# Patient Record
(demographics unavailable — no encounter records)

---

## 2024-10-28 NOTE — PROCEDURE
[FreeTextEntry1] : Plan:  Examination.  (Hb=[id number]).  Surgical debridement of the affected skin area.  We demonstrated how to apply lotion and made OTC recommendations.  Right 2nd toenail, right 3rd toenail, left 2nd toenail, left 3rd toenail and left 4th toenail.  Mycotic toenails were debrided using manual cutters and electrical  to patient tolerance. Patient to continue Penlac nail gel to the affected toenails daily. Patient to return:  1 month.

## 2024-10-28 NOTE — PHYSICAL EXAM
[FreeTextEntry3] : Vascular Exam: DP Pulse (left): 1/4. DP Pulse (right): 1/4. PT Pulse (left): 1/4. PT Pulse (right): 1/4. Capillary Return - L: Instantaneous. Capillary Return - R: Instantaneous. Temperature Grad - L: Hot to Warm. Temperature Grad - R: Hot to Warm.   [de-identified] : Orthopedic Exam:  Patient presents with semirigid hammertoes on both feet.  No discomfort upon examination at this time. [FreeTextEntry1] : Neurological Exam: Achilles Reflex - L: Delayed. Achilles Reflex - R: Delayed. Sharp / Dull - L: Decreased. Sharp / Dull - R: Decreased. Light Touch/pressure - L: Decreased. Light Touch/pressure - R: Decreased. Hot/cold - L: Decreased. Hot/cold - R: Decreased. Vibratory - L: Decreased. Vibratory - R: Decreased. [place] Nathan 5.07 - L: Diminished. [place] Nathan 5.07 - R: Diminished.

## 2024-10-28 NOTE — HISTORY OF PRESENT ILLNESS
[FreeTextEntry1] : Kole is a 54-year-old male who presents to our office for continued care of a fungus infection in his toenails and dry skin on his feet.  He continues to apply lotion and the nail gel daily.

## 2024-11-26 NOTE — PROCEDURE
[FreeTextEntry1] : Plan:  Examination.  ([id number]=[id number]).  We had a lengthy discussion concerning the patient's condition.  Rx: Bactroban ointment to be applied to the dermabrasions daily.   Surgical debridement of the affected skin area.  We demonstrated how to apply lotion and made OTC recommendations.  Right 2nd toenail, right 3rd toenail, left 2nd toenail, left 3rd toenail and left 4th toenail. ([id number]).  Mycotic toenails were debrided using manual cutters and electrical  to patient tolerance. Patient to continue Penlac nail gel to the affected toenails daily. Patient to return:  1 month.

## 2024-11-26 NOTE — PROCEDURE
[FreeTextEntry1] : Plan:  Examination.  (Pf=[id number]).  We had a lengthy discussion concerning the patient's condition.  Rx: Bactroban ointment to be applied to the dermabrasions daily.   Surgical debridement of the affected skin area.  We demonstrated how to apply lotion and made OTC recommendations.  Right 2nd toenail, right 3rd toenail, left 2nd toenail, left 3rd toenail and left 4th toenail. ([id number]).  Mycotic toenails were debrided using manual cutters and electrical  to patient tolerance. Patient to continue Penlac nail gel to the affected toenails daily. Patient to return:  1 month.

## 2024-11-26 NOTE — PHYSICAL EXAM
[FreeTextEntry3] : Vascular Exam: DP Pulse (left): 1/4. DP Pulse (right): 1/4. PT Pulse (left): 1/4. PT Pulse (right): 1/4. Capillary Return - L: Instantaneous. Capillary Return - R: Instantaneous. Temperature Grad - L: Hot to Warm. Temperature Grad - R: Hot to Warm.   [de-identified] : Orthopedic Exam:  Patient presents with semirigid hammertoes on both feet.  No discomfort upon examination at this time. [FreeTextEntry1] : Neurological Exam: Achilles Reflex - L: Delayed. Achilles Reflex - R: Delayed. Sharp / Dull - L: Decreased. Sharp / Dull - R: Decreased. Light Touch/pressure - L: Decreased. Light Touch/pressure - R: Decreased. Hot/cold - L: Decreased. Hot/cold - R: Decreased. Vibratory - L: Decreased. Vibratory - R: Decreased. [place] Nathan 5.07 - L: Diminished. [place] Nathan 5.07 - R: Diminished.

## 2024-11-26 NOTE — PHYSICAL EXAM
[FreeTextEntry3] : Vascular Exam: DP Pulse (left): 1/4. DP Pulse (right): 1/4. PT Pulse (left): 1/4. PT Pulse (right): 1/4. Capillary Return - L: Instantaneous. Capillary Return - R: Instantaneous. Temperature Grad - L: Hot to Warm. Temperature Grad - R: Hot to Warm.   [de-identified] : Orthopedic Exam:  Patient presents with semirigid hammertoes on both feet.  No discomfort upon examination at this time. [FreeTextEntry1] : Neurological Exam: Achilles Reflex - L: Delayed. Achilles Reflex - R: Delayed. Sharp / Dull - L: Decreased. Sharp / Dull - R: Decreased. Light Touch/pressure - L: Decreased. Light Touch/pressure - R: Decreased. Hot/cold - L: Decreased. Hot/cold - R: Decreased. Vibratory - L: Decreased. Vibratory - R: Decreased. [place] Nathan 5.07 - L: Diminished. [place] Nathan 5.07 - R: Diminished.

## 2024-11-26 NOTE — HISTORY OF PRESENT ILLNESS
[FreeTextEntry1] : Kole is a 54-year-old male who presents to our office for continued care of a fungus infection in his toenails and dry skin on his feet.  He continues to apply lotion and the nail gel daily.  He develops red spots on his feet at pressure points and is concerned for wounds.

## 2025-01-06 NOTE — PHYSICAL EXAM
[FreeTextEntry3] : Vascular Exam: DP Pulse (left): 1/4. DP Pulse (right): 1/4. PT Pulse (left): 1/4. PT Pulse (right): 1/4. Capillary Return - L: Instantaneous. Capillary Return - R: Instantaneous. Temperature Grad - L: Hot to Warm. Temperature Grad - R: Hot to Warm.   [de-identified] : Orthopedic Exam:  Patient presents with semirigid hammertoes on both feet.  No discomfort upon examination at this time. [FreeTextEntry1] : Neurological Exam: Achilles Reflex - L: Delayed. Achilles Reflex - R: Delayed. Sharp / Dull - L: Decreased. Sharp / Dull - R: Decreased. Light Touch/pressure - L: Decreased. Light Touch/pressure - R: Decreased. Hot/cold - L: Decreased. Hot/cold - R: Decreased. Vibratory - L: Decreased. Vibratory - R: Decreased. [place] Nathan 5.07 - L: Diminished. [place] Nathan 5.07 - R: Diminished.

## 2025-01-06 NOTE — PROCEDURE
[FreeTextEntry1] : Plan:  Examination.  ([id number]).  Right 2nd toenail, right 3rd toenail, left 2nd toenail, left 3rd toenail and left 4th toenail.  Mycotic toenails were debrided using manual cutters and electrical  to patient tolerance. Patient to continue Penlac nail gel to the affected toenails daily. Patient to return:  1 month.

## 2025-01-06 NOTE — HISTORY OF PRESENT ILLNESS
[FreeTextEntry1] : Kole is a 54-year-old male who presents to our office this afternoon for continued care of a fungus infection in his toenails and dry skin on his feet.  He continues to apply lotion and the nail gel daily.  He admits to being out of control with his sugar.  He just got his bloodwork and is hoping the MD has a plan to help him.

## 2025-02-24 NOTE — HISTORY OF PRESENT ILLNESS
[FreeTextEntry1] : Kole is a 55-year-old male who presents to our office this afternoon for continued care of a fungus infection in his toenails and dry skin on his feet.  He continues to apply lotion and the nail gel daily with slow improvement.  He is not happy with the Penlac.  He is requesting Formula 7.

## 2025-02-24 NOTE — PHYSICAL EXAM
[FreeTextEntry3] : Vascular Exam: DP Pulse (left): 1/4. DP Pulse (right): 1/4. PT Pulse (left): 1/4. PT Pulse (right): 1/4. Capillary Return - L: Instantaneous. Capillary Return - R: Instantaneous. Temperature Grad - L: Hot to Warm. Temperature Grad - R: Hot to Warm.   [de-identified] : Orthopedic Exam:  Patient presents with semirigid hammertoes on both feet.  No discomfort upon examination at this time. [FreeTextEntry1] : Neurological Exam: Achilles Reflex - L: Delayed. Achilles Reflex - R: Delayed. Sharp / Dull - L: Decreased. Sharp / Dull - R: Decreased. Light Touch/pressure - L: Decreased. Light Touch/pressure - R: Decreased. Hot/cold - L: Decreased. Hot/cold - R: Decreased. Vibratory - L: Decreased. Vibratory - R: Decreased. [place] Nathan 5.07 - L: Diminished. [place] Nathan 5.07 - R: Diminished.

## 2025-02-24 NOTE — PROCEDURE
[FreeTextEntry1] : Plan:  Examination.  (Hm=[id number]).  We had a lengthy discussion concerning the patient's condition.  Surgical debridement of the affected skin area.  Lotion was applied and he is to continue to apply it daily.  Right 2nd toenail, right 3rd toenail, left 2nd toenail, left 3rd toenail and left 4th toenail. ([id number]=[id number]).  Mycotic toenails were debrided using manual cutters and electrical  to patient tolerance. Patient is to discontinue Penlac. We ordered Formual 7 to be applied to the affected toenails daily. Patient to return:  1 month.

## 2025-03-24 NOTE — HISTORY OF PRESENT ILLNESS
[FreeTextEntry1] : Kole is a 55-year-old male who presents this afternoon for continued care of a fungus infection in his toenails and dry skin on his feet.  He continues to apply lotion and Formula 7 daily.  He is much happier with the improvement.

## 2025-03-24 NOTE — PROCEDURE
[FreeTextEntry1] : Plan:  Examination.  (Qp=[id number]).  We had a lengthy discussion concerning the patient's condition.  Surgical debridement of the affected skin area.  Lotion was applied and he is to continue to apply it daily.  Right 2nd toenail, right 3rd toenail, left 2nd toenail, left 3rd toenail and left 4th toenail.  Mycotic toenails were debrided using manual cutters and electrical  to patient tolerance. Patient is to continue Formula 7 to be applied to the affected toenails daily. Patient to return:  1 month.

## 2025-03-24 NOTE — PHYSICAL EXAM
[General Appearance - Alert] : alert [General Appearance - In No Acute Distress] : in no acute distress [FreeTextEntry3] : Vascular Exam: DP Pulse (left): 1/4. DP Pulse (right): 1/4. PT Pulse (left): 1/4. PT Pulse (right): 1/4. Capillary Return - L: Instantaneous. Capillary Return - R: Instantaneous. Temperature Grad - L: Hot to Warm. Temperature Grad - R: Hot to Warm.   [de-identified] : Orthopedic Exam:  Patient presents with semirigid hammertoes on both feet.  No discomfort upon examination at this time. [FreeTextEntry1] : Neurological Exam: Achilles Reflex - L: Delayed. Achilles Reflex - R: Delayed. Sharp / Dull - L: Decreased. Sharp / Dull - R: Decreased. Light Touch/pressure - L: Decreased. Light Touch/pressure - R: Decreased. Hot/cold - L: Decreased. Hot/cold - R: Decreased. Vibratory - L: Decreased. Vibratory - R: Decreased. [place] Nathan 5.07 - L: Diminished. [place] Nathan 5.07 - R: Diminished.

## 2025-05-05 NOTE — PHYSICAL EXAM
[General Appearance - Alert] : alert [General Appearance - In No Acute Distress] : in no acute distress [FreeTextEntry3] : Vascular Exam: DP Pulse (left): 1/4. DP Pulse (right): 1/4. PT Pulse (left): 1/4. PT Pulse (right): 1/4. Capillary Return - L: Instantaneous. Capillary Return - R: Instantaneous. Temperature Grad - L: Hot to Warm. Temperature Grad - R: Hot to Warm.   [de-identified] : Orthopedic Exam:  Patient presents with semirigid hammertoes on both feet.  No discomfort upon examination at this time. [FreeTextEntry1] : Neurological Exam: Achilles Reflex - L: Delayed. Achilles Reflex - R: Delayed. Sharp / Dull - L: Decreased. Sharp / Dull - R: Decreased. Light Touch/pressure - L: Decreased. Light Touch/pressure - R: Decreased. Hot/cold - L: Decreased. Hot/cold - R: Decreased. Vibratory - L: Decreased. Vibratory - R: Decreased. [place] Nathan 5.07 - L: Diminished. [place] Nathan 5.07 - R: Diminished.

## 2025-05-05 NOTE — PROCEDURE
[FreeTextEntry1] : Plan:  Examination.  ([id number]).  We had a lengthy discussion concerning the patient's condition.  Surgical debridement of the affected skin area.  Lotion was applied and he is to continue to apply it daily.  Right 2nd toenail, right 3rd toenail, left 2nd toenail, left 3rd toenail and left 4th toenail.  Mycotic toenails were debrided using manual cutters and electrical  to patient tolerance. ([id number]=[id number]).  Patient is to continue Formula 7 to be applied to the affected toenails daily. Patient to return:  1 month.

## 2025-05-05 NOTE — HISTORY OF PRESENT ILLNESS
[FreeTextEntry1] : Kole is a 55-year-old male who presents to the office this afternoon for continued care of a fungus infection in his toenails and dry skin on his feet.  He continues to apply lotion and Formula 7 daily.  He is happy with the improvement.

## 2025-05-05 NOTE — REVIEW OF SYSTEMS
[Negative] : Musculoskeletal [de-identified] : fungal toenails, dry skin, wound [de-identified] : neuropathy

## 2025-07-21 NOTE — PROCEDURE
[FreeTextEntry1] : Plan:  Examination.  (Ql=[id number]).  We had a lengthy discussion concerning the patient's condition.  Surgical debridement of the affected skin area.  Lotion was applied and he is to continue to apply it daily.  Right 2nd toenail, right 3rd toenail, left 2nd toenail, left 3rd toenail and left 4th toenail.  Mycotic toenails were debrided using manual cutters and electrical  to patient tolerance. ([id number]).  Patient is to continue Formula 7 to be applied to the affected toenails daily. Patient to return:  1 month.

## 2025-07-21 NOTE — HISTORY OF PRESENT ILLNESS
[FreeTextEntry1] : Kole is a 55-year-old male who presents for continued care of a fungus infection in his toenails and dry skin on his feet.  He continues to apply lotion and Formula 7 daily.  He slowly continues to improve.  He is morning the recent loss of his wife.

## 2025-07-21 NOTE — PHYSICAL EXAM
[General Appearance - Alert] : alert [General Appearance - In No Acute Distress] : in no acute distress [FreeTextEntry3] : Vascular Exam: DP Pulse (left): 1/4. DP Pulse (right): 1/4. PT Pulse (left): 1/4. PT Pulse (right): 1/4. Capillary Return - L: Instantaneous. Capillary Return - R: Instantaneous. Temperature Grad - L: Hot to Warm. Temperature Grad - R: Hot to Warm.   [de-identified] : Orthopedic Exam:  Patient presents with semirigid hammertoes on both feet.  No discomfort upon examination at this time. [FreeTextEntry1] : Neurological Exam: Achilles Reflex - L: Delayed. Achilles Reflex - R: Delayed. Sharp / Dull - L: Decreased. Sharp / Dull - R: Decreased. Light Touch/pressure - L: Decreased. Light Touch/pressure - R: Decreased. Hot/cold - L: Decreased. Hot/cold - R: Decreased. Vibratory - L: Decreased. Vibratory - R: Decreased. [place] Nathan 5.07 - L: Diminished. [place] Nathan 5.07 - R: Diminished.

## 2025-07-21 NOTE — REVIEW OF SYSTEMS
[Negative] : Musculoskeletal [de-identified] : fungal toenails, dry skin, wound [de-identified] : neuropathy